# Patient Record
(demographics unavailable — no encounter records)

---

## 2025-01-24 NOTE — PHYSICAL EXAM
[No JVD] : no jugular venous distention [Normal] : normal rate, regular rhythm, normal S1 and S2 and no murmur heard [No Edema] : there was no peripheral edema [Normal Affect] : the affect was normal [Normal Insight/Judgement] : insight and judgment were intact [de-identified] : obese

## 2025-01-24 NOTE — HEALTH RISK ASSESSMENT
[Yes] : Yes [Monthly or less (1 pt)] : Monthly or less (1 point) [1 or 2 (0 pts)] : 1 or 2 (0 points) [No falls in past year] : Patient reported no falls in the past year [0] : 2) Feeling down, depressed, or hopeless: Not at all (0) [Never] : Never [PHQ-2 Negative - No further assessment needed] : PHQ-2 Negative - No further assessment needed [de-identified] : occasionally [de-identified] : low cholesterol low sugar  [[id number]] : 0

## 2025-01-24 NOTE — HISTORY OF PRESENT ILLNESS
[FreeTextEntry1] : follow-up [de-identified] : Pt here for f/u visit. He has been working 16 hour shifts nearly every day- no time for exercise.  He saw a cardiologist, Dr Pena and had a neg cardiology work up. States his b/p meds were adjusted. No cardiac complaints. Has been compliant with GLP 1 RA, no neg effects. He is trying to comply with a diabetic diet.

## 2025-02-05 NOTE — HISTORY OF PRESENT ILLNESS
[FreeTextEntry1] : Feb 05, 2025     in person    in person     PCP:  Dr. Cait Ordonez  CC:  Diabetes  42 yo visits for diabetes.  1/27/25 A1c 8.3 %   Self monitors with a Verio meter. Today afternoon  and has not eaten since last night.    On Ozemic 2 mg weekly glipizide ER 10 mg in AM Stopped metformin because of diarrhea 1000 mg BID  : : Constitutional:  Alert, well nourished, healthy appearance, no acute distress  Eyes:  No proptosis, no stare Thyroid: Pulmonary:  No respiratory distress, no accessory muscle use; normal rate and effort Cardiac:  Normal rate Vascular:  Endocrine:  No stigmata of Cushings Syndrome Musculoskeletal:  Normal gait, no involuntary movements Neurology:  No tremors Affect/Mood/Psych:  Oriented x 3; normal affect, normal insight/judgement, normal mood  . Impression:  Would likely benefit from trial of CGM - e.g., Rashaun 3 Plus.

## 2025-04-02 NOTE — REVIEW OF SYSTEMS
[Lower Ext Edema] : lower extremity edema [Pain/Numbness of Digits] : pain/numbness of digits [Polydipsia] : polydipsia [Negative] : Psychiatric

## 2025-04-02 NOTE — HISTORY OF PRESENT ILLNESS
[FreeTextEntry1] : Diagnosed in 2021  A1C 8.3% (1/2025)  PMH: HTN, HLD Diabetes associated complications: B/L LE paresthesia   Last eye exam: 3/2025  Social: , 12 hr shift  Lives with wife & 2 kids    Home diabetes medications: Ozempic 2 mg weekly Glipizide ER 10 mg in AM  Medications previously tried: Metformin --diarrhea 1000 mg BID   SMBG/CGM: Rashaun 3 --Feb, sample  Time in Ranges   >250 1% >180  36%   63% <70  0%  Hypoglycemia: none Severe hypoglycemia: none   Dietary patterns: IR on/off Juicing ever other wk Teas with lemon 2 meals per day S-fruit, nuts, figs  B: Egg, cheese Or bauer on bread  L/D: Veggies, protein, some carbs  ETOH: has cut back significantly; once a month  Lots of water    Physical activity: Planning to increase

## 2025-04-02 NOTE — ASSESSMENT
[FreeTextEntry1] :  Nutrition: Reviewed diet; avoid having carbs alone, balance with fiber, protein, healthy fats to decrease glucose spike. Physical activity: Planning to restart physical activity as his work schedule improves Glucose monitoring: Would benefit from CGM e-prescribed Rashaun 3 to local pharm Medications: Initiate Jardiance 10 mg daily; continue with Ozempic and Glipizide.  Use compression stockings and elevate legs when sitting due to edema BP above goal today; did not take am meds yesterday or today--increase consistency   RTO 7/2025

## 2025-07-02 NOTE — HISTORY OF PRESENT ILLNESS
[FreeTextEntry1] : Diagnosed in   A1C 8.3% (2025)  PMH: HTN, HLD Diabetes associated complications: B/L LE paresthesia   Last eye exam: 3/2025  Social: , 12 hr shift  Lives with wife & 2 kids    Home diabetes medications: Ozempic 2 mg weekly Glipizide ER 10 mg in AM  Jardiance 10 mg daily-- did not  at the pharm  Medications previously tried: Metformin --diarrhea 1000 mg BID  No longer on Carvedilol due to LE edema  Taking a lot of natural supplements.    SMBG/CGM: Rashaun 3 PLUS-- did not  at the pharm  Fastin-220  Hypoglycemia: none Severe hypoglycemia: none   Dietary patterns: Struggling with staying on track with meals  No soda  2 meals per day  Breakfast this morning: Bagel Caramel coffee   Dinner  Salad with cold cuts   ETOH: has cut back significantly Lots of water    Physical activity: Not consistent; recently restarted

## 2025-07-02 NOTE — ASSESSMENT
[FreeTextEntry1] :  Nutrition: Reviewed diet; focus on balancing meals rather than being extremely strict with avoid certain foods Physical activity: Planning to restart physical activity  Glucose monitoring: Would benefit from CGM e-prescribed Rashaun 3 to local pharm--re-sent  Medications: Initiate Jardiance 10 mg daily; continue with Ozempic and Glipizide. Re-check A1C & complete metabolic   RTO 9/2025